# Patient Record
Sex: FEMALE | Race: WHITE
[De-identification: names, ages, dates, MRNs, and addresses within clinical notes are randomized per-mention and may not be internally consistent; named-entity substitution may affect disease eponyms.]

---

## 2018-03-20 ENCOUNTER — HOSPITAL ENCOUNTER (OUTPATIENT)
Dept: HOSPITAL 17 - PHSDC | Age: 63
Discharge: HOME | End: 2018-03-20
Payer: MEDICARE

## 2018-03-20 VITALS — TEMPERATURE: 97.5 F

## 2018-03-20 VITALS — HEIGHT: 62 IN | WEIGHT: 115.19 LBS | BODY MASS INDEX: 21.2 KG/M2

## 2018-03-20 VITALS
SYSTOLIC BLOOD PRESSURE: 101 MMHG | OXYGEN SATURATION: 97 % | HEART RATE: 90 BPM | RESPIRATION RATE: 16 BRPM | DIASTOLIC BLOOD PRESSURE: 70 MMHG

## 2018-03-20 DIAGNOSIS — C44.311: Primary | ICD-10-CM

## 2018-03-20 DIAGNOSIS — C44.112: ICD-10-CM

## 2018-03-20 PROCEDURE — 88331 PATH CONSLTJ SURG 1 BLK 1SPC: CPT

## 2018-03-20 PROCEDURE — 11642 EXC F/E/E/N/L MAL+MRG 1.1-2: CPT

## 2018-03-20 PROCEDURE — 88305 TISSUE EXAM BY PATHOLOGIST: CPT

## 2018-03-20 PROCEDURE — 15260 FTH/GFT FR N/E/E/L 20 SQCM/<: CPT

## 2018-03-20 PROCEDURE — 00300 ANES ALL PX INTEG H/N/PTRUNK: CPT

## 2018-03-20 NOTE — PD.OP
__________________________________________________





Operative Report


Basal cell carcinoma Right medial nose / eyelid skin


Postoperative Diagnosis:  


Basal cell carcinoma Right medial nose / eyelid skin


Procedure:


Excision Basal cell carcinoma Right medial nose / eyelid skin 1.5 x 1.2 cm,


frozen section


Full thickness skin graft from right posterior neck


Anesthesia:


gen


Surgeon:


Naresh Noel


Assistant(s):


rn


Resident Surgeon:


no


Operation and Findings:


Patient has undergone detailed explanation of the surgery, excision, frozen 

section and reconstruction with full thickness skin graft 


from neck / mastoid area. General risks and possible complications, including 

loss of skin graft and future surgeries were discussed.


Patient is willing to go ahead with surgery.





Preoperative markings were made in holding area.


Patient was brought to the OR, anesthesia started,


prep and drape done, time out completed.


Right medial nose area - extending to the medial canthus and upper eyelid 

markings reinforced,


Lidocaine 1% with epi and saline diluted mix injected,


specimen excised and suture marked medial corner, sent for frozen section, 

Excised size 1.5 x 1.2 cm


Oversized full thickness graft harvested from posterior lower neck skin - to 

match the expected thickness needed.


Graft thinned on the eyelid side part, sutured in with 5/0 prolene leaving 

nasal side long.


Donor site closed with Internal vicryl sutures and dermabond.


Frozen section indicated small residual BCC and clean margins.


Graft insetting completed, tie over dressing applied with Xeroform and eye pad.


Patient remained stable, no complications, blood loss less than 2-3 cc.











Naresh Noel MD Mar 20, 2018 11:30